# Patient Record
Sex: MALE | Race: WHITE | NOT HISPANIC OR LATINO | Employment: FULL TIME | URBAN - METROPOLITAN AREA
[De-identification: names, ages, dates, MRNs, and addresses within clinical notes are randomized per-mention and may not be internally consistent; named-entity substitution may affect disease eponyms.]

---

## 2019-01-24 ENCOUNTER — HOSPITAL ENCOUNTER (EMERGENCY)
Facility: CLINIC | Age: 54
Discharge: HOME | End: 2019-01-24
Attending: EMERGENCY MEDICINE | Admitting: EMERGENCY MEDICINE
Payer: COMMERCIAL

## 2019-01-24 VITALS
RESPIRATION RATE: 16 BRPM | HEART RATE: 95 BPM | OXYGEN SATURATION: 97 % | HEIGHT: 74 IN | TEMPERATURE: 99.5 F | DIASTOLIC BLOOD PRESSURE: 84 MMHG | BODY MASS INDEX: 38.5 KG/M2 | WEIGHT: 300 LBS | SYSTOLIC BLOOD PRESSURE: 159 MMHG

## 2019-01-24 DIAGNOSIS — F10.10 ALCOHOL ABUSE: ICD-10-CM

## 2019-01-24 LAB
ALBUMIN SERPL-MCNC: 4.1 G/DL (ref 3.4–5)
ALP SERPL-CCNC: 73 U/L (ref 40–150)
ALT SERPL W P-5'-P-CCNC: 40 U/L (ref 0–70)
ANION GAP SERPL CALCULATED.3IONS-SCNC: 10 MMOL/L (ref 3–14)
AST SERPL W P-5'-P-CCNC: 42 U/L (ref 0–45)
BASOPHILS # BLD AUTO: 0.1 10E9/L (ref 0–0.2)
BASOPHILS NFR BLD AUTO: 0.6 %
BILIRUB SERPL-MCNC: 1.6 MG/DL (ref 0.2–1.3)
BUN SERPL-MCNC: 18 MG/DL (ref 7–30)
CALCIUM SERPL-MCNC: 8.1 MG/DL (ref 8.5–10.1)
CHLORIDE SERPL-SCNC: 97 MMOL/L (ref 94–109)
CO2 SERPL-SCNC: 24 MMOL/L (ref 20–32)
CREAT SERPL-MCNC: 0.73 MG/DL (ref 0.66–1.25)
DIFFERENTIAL METHOD BLD: ABNORMAL
EOSINOPHIL # BLD AUTO: 0 10E9/L (ref 0–0.7)
EOSINOPHIL NFR BLD AUTO: 0.1 %
ERYTHROCYTE [DISTWIDTH] IN BLOOD BY AUTOMATED COUNT: 12.8 % (ref 10–15)
ETHANOL SERPL-MCNC: <0.01 G/DL
GFR SERPL CREATININE-BSD FRML MDRD: >90 ML/MIN/{1.73_M2}
GLUCOSE SERPL-MCNC: 156 MG/DL (ref 70–99)
HCT VFR BLD AUTO: 45.9 % (ref 40–53)
HGB BLD-MCNC: 16.4 G/DL (ref 13.3–17.7)
IMM GRANULOCYTES # BLD: 0 10E9/L (ref 0–0.4)
IMM GRANULOCYTES NFR BLD: 0.2 %
LIPASE SERPL-CCNC: 55 U/L (ref 73–393)
LYMPHOCYTES # BLD AUTO: 1.4 10E9/L (ref 0.8–5.3)
LYMPHOCYTES NFR BLD AUTO: 14.1 %
MAGNESIUM SERPL-MCNC: 1.6 MG/DL (ref 1.6–2.3)
MCH RBC QN AUTO: 33.5 PG (ref 26.5–33)
MCHC RBC AUTO-ENTMCNC: 35.7 G/DL (ref 31.5–36.5)
MCV RBC AUTO: 94 FL (ref 78–100)
MONOCYTES # BLD AUTO: 0.4 10E9/L (ref 0–1.3)
MONOCYTES NFR BLD AUTO: 3.9 %
NEUTROPHILS # BLD AUTO: 8.3 10E9/L (ref 1.6–8.3)
NEUTROPHILS NFR BLD AUTO: 81.1 %
NRBC # BLD AUTO: 0 10*3/UL
NRBC BLD AUTO-RTO: 0 /100
PLATELET # BLD AUTO: 166 10E9/L (ref 150–450)
POTASSIUM SERPL-SCNC: 3.6 MMOL/L (ref 3.4–5.3)
PROT SERPL-MCNC: 8.2 G/DL (ref 6.8–8.8)
RBC # BLD AUTO: 4.89 10E12/L (ref 4.4–5.9)
SODIUM SERPL-SCNC: 131 MMOL/L (ref 133–144)
WBC # BLD AUTO: 10.2 10E9/L (ref 4–11)

## 2019-01-24 PROCEDURE — 90791 PSYCH DIAGNOSTIC EVALUATION: CPT

## 2019-01-24 PROCEDURE — 80053 COMPREHEN METABOLIC PANEL: CPT | Performed by: EMERGENCY MEDICINE

## 2019-01-24 PROCEDURE — 85025 COMPLETE CBC W/AUTO DIFF WBC: CPT | Performed by: EMERGENCY MEDICINE

## 2019-01-24 PROCEDURE — 96376 TX/PRO/DX INJ SAME DRUG ADON: CPT

## 2019-01-24 PROCEDURE — 96361 HYDRATE IV INFUSION ADD-ON: CPT

## 2019-01-24 PROCEDURE — 99285 EMERGENCY DEPT VISIT HI MDM: CPT | Mod: 25

## 2019-01-24 PROCEDURE — 25000128 H RX IP 250 OP 636: Performed by: EMERGENCY MEDICINE

## 2019-01-24 PROCEDURE — 83735 ASSAY OF MAGNESIUM: CPT | Performed by: EMERGENCY MEDICINE

## 2019-01-24 PROCEDURE — 93005 ELECTROCARDIOGRAM TRACING: CPT

## 2019-01-24 PROCEDURE — 83690 ASSAY OF LIPASE: CPT | Performed by: EMERGENCY MEDICINE

## 2019-01-24 PROCEDURE — 80320 DRUG SCREEN QUANTALCOHOLS: CPT | Performed by: EMERGENCY MEDICINE

## 2019-01-24 PROCEDURE — 96374 THER/PROPH/DIAG INJ IV PUSH: CPT

## 2019-01-24 RX ORDER — LORAZEPAM 2 MG/ML
1 INJECTION INTRAMUSCULAR ONCE
Status: COMPLETED | OUTPATIENT
Start: 2019-01-24 | End: 2019-01-24

## 2019-01-24 RX ORDER — CHLORDIAZEPOXIDE HYDROCHLORIDE 25 MG/1
25 CAPSULE, GELATIN COATED ORAL 3 TIMES DAILY PRN
Qty: 15 CAPSULE | Refills: 0 | Status: SHIPPED | OUTPATIENT
Start: 2019-01-24 | End: 2019-01-29

## 2019-01-24 RX ORDER — LORAZEPAM 2 MG/ML
0.5 INJECTION INTRAMUSCULAR ONCE
Status: COMPLETED | OUTPATIENT
Start: 2019-01-24 | End: 2019-01-24

## 2019-01-24 RX ADMIN — LORAZEPAM 1 MG: 2 INJECTION, SOLUTION INTRAMUSCULAR; INTRAVENOUS at 18:16

## 2019-01-24 RX ADMIN — SODIUM CHLORIDE 1000 ML: 9 INJECTION, SOLUTION INTRAVENOUS at 18:16

## 2019-01-24 RX ADMIN — LORAZEPAM 0.5 MG: 2 INJECTION, SOLUTION INTRAMUSCULAR; INTRAVENOUS at 19:07

## 2019-01-24 ASSESSMENT — ENCOUNTER SYMPTOMS
HEADACHES: 1
SHORTNESS OF BREATH: 0
MYALGIAS: 1
TREMORS: 1
VOMITING: 1
HALLUCINATIONS: 0
WEAKNESS: 0
NAUSEA: 1
FEVER: 0

## 2019-01-24 ASSESSMENT — MIFFLIN-ST. JEOR: SCORE: 2280.54

## 2019-01-24 NOTE — ED PROVIDER NOTES
History     Chief Complaint:  Alcohol Withdrawal    HPI   Amrik Burdick is a 54 year old male with history of alcohol abuse and HTN who presents to the emergency department for evaluation of symptoms related to alcohol withdrawal. He reports that his last drink was about 24 hours ago; he is trying to quit. Today, he became tremulous and nauseous at home with occasional hot flashes and dyspnea. He called [facility] for treatment possibilities, and they told him to present to the ED if his symptoms worsen. Here, he reports a mild frontal headache and myalgias. He denies any chest pain, fevers, hallucinations, or suicidal/homicidal ideations. He denies any recent vomiting, but reports he has only eaten a banana today. Previously, he was drinking about 750 mL of vodka per day. He has gone through withdrawal in the past, but denies any history of associated seizures, nor has he been admitted to a detox program.     Allergies:  NKDA    Medications:    The patient is currently on no regular medications.    Past Medical History:    Alcohol abuse.   Gout  HTN  Hyperlipidemia    Past Surgical History:    The patient does not have any pertinent past surgical history.    Family History:    No past pertinent family history.    Social History:  Marital Status:   [2]  Wife dropped him off at ED.   Positive for alcohol use.   Negative for drug use.      Review of Systems   Constitutional: Negative for fever.   Respiratory: Negative for shortness of breath.    Cardiovascular: Negative for chest pain.   Gastrointestinal: Positive for nausea and vomiting (not recently).   Musculoskeletal: Positive for myalgias.   Neurological: Positive for tremors and headaches. Negative for weakness.   Psychiatric/Behavioral: Negative for hallucinations and suicidal ideas.   All other systems reviewed and are negative.      Physical Exam     Patient Vitals for the past 24 hrs:   BP Temp Temp src Pulse Resp SpO2 Height Weight   01/24/19 1900  "(!) 158/94 -- -- 92 -- 98 % -- --   01/24/19 1830 (!) 170/99 -- -- 95 -- 99 % -- --   01/24/19 1810 -- 99.5  F (37.5  C) Oral -- -- -- -- --   01/24/19 1800 (!) 174/109 -- -- 98 -- 99 % -- --   01/24/19 1754 -- -- -- -- -- -- 1.88 m (6' 2\") 136.1 kg (300 lb)   01/24/19 1744 (!) 141/99 -- -- 101 16 98 % -- --     Physical Exam  Nursing note and vitals reviewed.  Constitutional: Well nourished. Resting comfortably.   Eyes: Conjunctiva normal.  Pupils are equal, round, and reactive to light.   ENT: Nose normal. Mucous membranes pink and moist.    Neck: Normal range of motion.  CVS: Sinus tachycardia.  Normal heart sounds.  No murmur.  Pulmonary: Lungs clear to auscultation bilaterally. No wheezes/rales/rhonchi.  GI: Abdomen soft. Nontender, nondistended. No rigidity or guarding.    MSK: No calf tenderness or swelling.  Neuro: Awake, alert. Speech is normal and fluent. Face is symmetric. EOMI. PERRL. Moves all extremities. Normal finger-nose-finger.  strength equal bilaterally. Equal sensation bilaterally on UE/LE and face. No arm or leg drift. Gait stable. Mildly tremulous.   Skin: Skin is warm and dry. No rash noted.   Psychiatric: Mildly anxious appearing. Denies suicidal or homicidal ideations. Good insight. No hallucinations.        Emergency Department Course   ECG:  Indication: Withdrawal  Time: 1803  Vent. Rate 95 bpm. PA interval 164. QRS duration 86. QT/QTc 382/480. P-R-T axis 48 16 40.  Normal sinus rhythm. Prolonged QT. Abnormal ECG. Read time: 1804.    Laboratory:  CBC: WBC: 10.2, HGB: 16.4, PLT: 166  CMP: Glucose 156 (H), Na: 131 (L), Ca: 8.1 (L), Bilirubin: 1.6 (H), o/w WNL (Creatinine: 0.73)    Lipase: 55 (L)  Magnesium: 1.6   Alcohol level: <0.01    Interventions:  1816 NS 1L IV   Ativan, 1 mg, IV injection  1907 Ativan, 0.5 mg, IV injection    Emergency Department Course:  Nursing notes and vitals reviewed.   (1759) I performed an exam of the patient as documented above.      IV inserted. Medicine " administered as documented above. Blood drawn. This was sent to the lab for further testing, results above.     (1829) I rechecked the patient and discussed the results of his workup thus far. He is feeling better.     (1913) [facility] is in with the patient at this time. They provided patient with list of outpatient resources.        Findings and plan explained to the Patient. Patient discharged home with instructions regarding supportive care, medications, and reasons to return. The importance of close follow-up was reviewed. The patient was prescribed Librium and provided referrals for detox from [facility].      I personally reviewed the laboratory results with the Patient and answered all related questions prior to discharge.       Impression & Plan      Medical Decision Making:  Amrik Burdick is a 54 year old male with a history of alcohol abuse and hypertension presenting with concerns for alcohol withdrawal. He is mildly tremulous and hypertensive on arrival. He denies any hallucinations, suicidal or homicidal ideations, or response to internal stimuli. Labs reassuring.  On reevaluation, after IV fluids and benzodiazepines, he reported significant symptom improvement. His report vitals improved.  Patient was evaluated by [facility] team who provided patient outpatient resources for alcohol detox programs. His wife was in agreement with plan. Patient will be discharged home with a few doses of librium to curb withdrawal side effects. He was instructed to return to the ED for worsening symptoms. Wife picked patient up from the ED in stable condition.      Critical Care time:  none    Diagnosis:    ICD-10-CM    1. Alcohol abuse F10.10        Disposition:  discharged to home    Discharge Medications:     Medication List      Started    chlordiazePOXIDE 25 MG capsule  Commonly known as:  LIBRIUM  25 mg, Oral, 3 TIMES DAILY PRN          Scribe Disclosure:  I, Marii Frazier, am serving as a scribe on 1/24/2019 at 5:59 PM to  personally document services performed by Renée Burdick DO based on my observations and the provider's statements to me.     Marii Freddie  1/24/2019   [facility] EMERGENCY DEPARTMENT         Renée Burdick DO  01/24/19 8583

## 2019-01-24 NOTE — ED AVS SNAPSHOT
[facility] Emergency Department  [address]  [facility] [address]  Phone:  [phone]  Fax:  [phone]                                    Amrik Burdick   MRN: [id number]    Department:  [facility] Emergency Department   Date of Visit:  1/24/2019           After Visit Summary Signature Page    I have received my discharge instructions, and my questions have been answered. I have discussed any challenges I see with this plan with the nurse or doctor.    ..........................................................................................................................................  Patient/Patient Representative Signature      ..........................................................................................................................................  Patient Representative Print Name and Relationship to Patient    ..................................................               ................................................  Date                                   Time    ..........................................................................................................................................  Reviewed by Signature/Title    ...................................................              ..............................................  Date                                               Time          22EPIC Rev 08/18

## 2019-01-24 NOTE — ED TRIAGE NOTES
Patient states that he is looking to get treatment for ETOH abuse and hasn't had a drink in 24 hours. Has sever shaking and unable to sleep. Stomach aches, denies nausea and vomiting. Denies history of seizures, has treatment in place and was told to come to the ED to be medically cleared.

## 2019-01-25 LAB — INTERPRETATION ECG - MUSE: NORMAL

## 2019-01-31 ENCOUNTER — HOSPITAL ENCOUNTER (OUTPATIENT)
Dept: BEHAVIORAL HEALTH | Facility: CLINIC | Age: 54
Discharge: HOME | End: 2019-01-31
Attending: SOCIAL WORKER | Admitting: SOCIAL WORKER
Payer: COMMERCIAL

## 2019-01-31 PROCEDURE — H0001 ALCOHOL AND/OR DRUG ASSESS: HCPCS

## 2019-01-31 RX ORDER — LISINOPRIL 10 MG/1
10 TABLET ORAL DAILY
COMMUNITY

## 2019-01-31 RX ORDER — SIMVASTATIN 10 MG
10 TABLET ORAL AT BEDTIME
COMMUNITY

## 2019-01-31 ASSESSMENT — ANXIETY QUESTIONNAIRES
2. NOT BEING ABLE TO STOP OR CONTROL WORRYING: NOT AT ALL
4. TROUBLE RELAXING: NOT AT ALL
7. FEELING AFRAID AS IF SOMETHING AWFUL MIGHT HAPPEN: NOT AT ALL
GAD7 TOTAL SCORE: 0
1. FEELING NERVOUS, ANXIOUS, OR ON EDGE: NOT AT ALL
6. BECOMING EASILY ANNOYED OR IRRITABLE: NOT AT ALL
5. BEING SO RESTLESS THAT IT IS HARD TO SIT STILL: NOT AT ALL
3. WORRYING TOO MUCH ABOUT DIFFERENT THINGS: NOT AT ALL

## 2019-01-31 ASSESSMENT — PAIN SCALES - GENERAL: PAINLEVEL: NO PAIN (0)

## 2019-01-31 ASSESSMENT — PATIENT HEALTH QUESTIONNAIRE - PHQ9: SUM OF ALL RESPONSES TO PHQ QUESTIONS 1-9: 0

## 2019-01-31 NOTE — PROGRESS NOTES
[facility]  [address], [address]  [place] [address]      ADULT CD ASSESSMENT ADDENDUM      Patient Name: Amrik Burdick  Cell Phone:   Home: [phone] (home)    Mobile:   Telephone Information:   Mobile [phone]       Email:  [email or web address]  Emergency Contact: Kerry Burdick   Tel: [phone]    The patient reported being:      With which race do you identify? White    Initial Screening Questions     1. Are you currently having severe withdrawal symptoms that are putting yourself or others in danger?  No    2. Are you currently having severe medical problems that require immediate attention?  No    3. Are you currently having severe emotional or behavioral problems that are putting yourself or others at risk of harm?  No    4. Do you have sufficient reading skills that will enable you to understand written materials, including the program rules and client rights materials?  Yes     Family History and other additional information     Who raised you? (parents, grandparents, adoptive parents, step-parents, etc.)    Both Parents    Please tell me what it was like growing up in your family. (please include any history of substance abuse, mental health issues, emotional/physical/sexual abuse, forms of discipline, and support)     Parents  (second grade), 1 full brother, 1 half sister, and a bunch of step-siblings.  No substance abuse in family history.  No known mental health.  No abuse.  Good upbringing, dad lived in [place] and mom lived in [place].      Do you have any children or Stepchildren? Yes, explain: 2 adult sons    Are you being investigated by Child Protection Services? No    Do you have a child protection worker, probation office or ?  No    How would you describe your current finances?  Doing okay    If you are having problems, (unpaid bills, bankruptcy, IRS problems) please explain:  No    If working or a student are you able to  function appropriately in that setting? Yes     Describe your preferred learning style:  by reading    What are your some of your personal strengths?  Good friends    Do you currently participate in community linda activities, such as attending Hindu, temple, Advent or Catholic services?  No    How does your spirituality impact your recovery?  Not in recovery.    Do you currently self-administer your medications?  Yes    Have you ever had to lie to people important to you about how much you mcginnis?   No   Have you ever felt the need to bet more and more money?   No   Have you ever attempted treatment for a gambling problem?   No   Have you ever touched or fondled someone else inappropriately or forced them to have sex with you against their will?   No   Are you or have you ever been a registered sex offender?   No   Is there any history of sexual abuse in your family? No   Have you ever felt obsessed by your sexual behavior, such as having sex with many partners, masturbating often, using pornography often?   No     Have you ever received therapy or stayed in the hospital for mental health problems?   No     Have you ever hurt yourself, such as cutting, burning or hitting yourself?   No     Have you ever purged, binged or restricted yourself as a way to control your weight?   No     Are you on a special diet?   No     Do you have any concerns regarding your nutritional status?   No     Have you had any appetite changes in the last 3 months?   No   Have you had weight loss or weight gain of more than 10 lbs in the last 3 months?   If patient gained or lost more than 10 lbs, then refer to program RN / attending Physician for assessment.   No   Was the patient informed of BMI?       No   Have you engaged in any risk-taking behavior that would put you at risk for exposure to blood-borne or sexually transmitted diseases?   No   Do you have any dental problems?   No   Have you ever lived through any trauma or stressful  life events?   No   In the past month, have you had any of the following symptoms related to the trauma listed above? (dreams, intense memories, flashbacks, physical reactions, etc.)   No   Have you ever believed people were spying on you, or that someone was plotting against you or trying to hurt you?   No   Have you ever believed someone was reading your mind or could hear your thoughts or that you could actually read someone's mind or hear what another person was thinking?   No   Have you ever believed that someone of some force outside of yourself was putting thoughts into your mind or made you act in a way that was not your usual self?  Have you ever though you were possessed?   No   Have you ever believed you were being sent special messages through the TV, radio or newspaper?   No   Have you ever heard things other people couldn't hear, such as voices or other noises?   No   Have you ever had visions when you were awake?  Or have you ever seen things other people couldn't see?   No   Do you have a valid 's license?    Yes     PHQ-9, YEVGENIY-7 and Suicide Risk Assessment   PHQ-9 on 1/31/2019 YEVGENIY-7 on 1/31/2019   The patient's PHQ-9 score was 0 out of 27, indicating no depression.   The patient's YEVGENIY-7 score was 0 out of 21, indicating minimal anxiety.       [place]-Suicide Severity Rating Scale   Suicide Ideation   1.) Have you ever wished you were dead or that you could go to sleep and not wake up?     Lifetime:  No   Past Month:  No     2.) Have you actually had any thoughts of killing yourself?   Lifetime:  No   Past Month:  No     3.) Have you been thinking about how you might do this?     Lifetime:  No   Past Month:  No     4.) Have you had these thoughts and had some intention of acting on them?     Lifetime:  No   Past Month:  No         Lifetime:  No   Past Month:  No     6.) Do you intend to carry out this plan?      Lifetime:  No   Past Month:  No     Intensity of Ideation   Intensity of ideation  (1 being least severe, 5 being most severe):     Lifetime:  The patient denied ever having any suicidal thoughts in life.   Past Month:  The patient denied having any suicidal thoughts within the past month.     How often do you have these thoughts?  The patient denied having any suicidal thoughts within the past month.     When you have the thoughts how long do they last?  The patient denied having any suicidal thoughts within the past month.     Can you stop thinking about killing yourself or wanting to die if you want to?  The patient denied having any suicidal thoughts within the past month.     Are there things - anyone or anything (i.e. family, Hoahaoism, pain of death) that stopped you from wanting to die or acting on thoughts of suicide?  Does not apply     What sort of reasons did you have for thinking about wanting to die or killing yourself (ie end pain, stop how you were feeling, get attention or reaction, revenge)?  Does not apply     Suicidal Behavior   (Suicide Attempt) - Have you made a suicide attempt?     Lifetime:  The patient had never made a suicide attempt.   Past Month:  The patient had not made a suicide attempt within the past month.     Have you engaged in self-harm (non-suicidal self-injury)?  The patient denied having any history of engaging in self-harm (non-suicidal self-injury).     (Interrupted Attempt) - Has there been a time when you started to do something to end your life but someone or something stopped you before you actually did anything?  The patient denied having any history of an interrupted suicide attempt.     (Aborted or Self-Interrupted Attempt) - Has there been a time when you started to do something to try to end your life but you stopped yourself before you actually did anything?  The patient denied having any history of an aborted or self-interrupted suicide attempt.     (Preparatory Acts of Behavior) - Have you taken any steps towards making suicide attempt or  "preparing to kill yourself (such as collecting pills, getting a gun, giving valuables away or writing a suicide note)?  The patient denied having any history of taking any steps towards making a suicide attempt or preparing to kill self.     Actual Lethality/Medical Damage:  The patient denied ever making a suicidal attempt.       2008  [facility]  Used with permission by Natalie Ag, PhD.       Guide to C-SSRS Risk Ratings   NO IDEATION:  with no active thoughts IDEATION: with a wish to die. IDEATION: with active thoughts. Risk Ratings   If Yes No No 0 - Very Low Risk   If NA Yes No 1 - Low Risk   If NA Yes Yes 2 - Low/moderate risk   IDEATION: associated thoughts of methods without intent or plan INTENT: Intent to follow through on suicide PLAN: Plan to follow through on suicide Risk Ratings cont...   If Yes No No 3 - Moderate Risk   If Yes Yes No 4 - High Risk   If Yes Yes Yes 5 - High Risk   The patient's ADDITIONAL RISK FACTORS and lack of PROTECTIVE FACTORS may increase their overall suicide risk ratings.     Additional Risk Factors:    Substance use   Protective Factors:    Having people in his/her life that would prevent the patient from considering a suicide attempt (i.e. young children, spouse, parents, etc.)     Having a good community support network     Risk Status   Past month:0. - Very Low Risk:  Evaluation Counselors:  Document in Epic / ip.accessAR to counselor \"Very Low Risk\".      Treatment Counselors:  Reassess upon admission as applicable, assess weekly in progress notes under Dimension 3 and summarize in Discharge / Treatment summary under Dimension 3.    Past 24 hours:0. - Very Low Risk:  Evaluation Counselors:  Document in Epic / SBAR to counselor \"Very Low Risk\".      Treatment Counselors:  Reassess upon admission as applicable, assess weekly in progress notes under Dimension 3 and summarize in Discharge / Treatment summary under Dimension 3.   Additional " information to support suicide risk rating: There was no additional information to provide at this time.     Mental Health Status   Physical Appearance/Attire: Appears stated age and Attire appropriate to age/situation   Hygiene: well groomed   Eye Contact: at examiner   Speech Rate:  regular   Speech Volume: regular   Speech Quality: fluid   Cognitive/Perceptual:  reality based   Cognition: memory intact    Judgment: intact   Insight: intact   Orientation:  time, place, person and situation   Thought: logical    Hallucinations:  none   General Behavioral Tone: cooperative   Psychomotor Activity: no problem noted   Gait:  no problem   Mood: appropriate   Affect: congruence/appropriate   Counselor Notes: NA     Criteria for Diagnosis: DSM-5 Criteria for Substance Use Disorders      Alcohol Use Disorder Mild - 305.00 (F10.10)   Tobacco Use Disorder Mild - 305.10 (Z72.0)    Level of Care   I.) Intoxication and Withdrawal: 0   II.) Biomedical:  0   III.) Emotional and Behavioral:  0   IV.) Readiness to Change:  1   V.) Relapse Potential: 1   VI.) Recovery Environmental: 1     Initial Problem List     None.    Patient/Client is willing to follow treatment recommendations.  Yes    Counselor: Radha [facility]ora [facility]      Vulnerable Adult Checklist for OUTPATIENTS     1.  Do you have a physical, emotional or mental infirmity or dysfunction?       No    2.  Does this issue impair your ability to provide for your own care without help, including providing yourself with food, shelter, clothing, healthcare or supervision?       No    3.  Because of this issue, I need assistance to protect myself from maltreatment by others.      No    Based on the above information:    This person is not a functional Vulnerable Adult according to [place] Statute 626.5572 subdivision 21.

## 2019-01-31 NOTE — PROGRESS NOTES
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 currently meets the criteria for Substance Use Disorder using the diagnostic criteria in the DSM-V pp.481-581. Current means during the most recent 12 months outside a facility that controls access to substances    Category of Substance Severity (ICD-10 Code / DSM 5 Code)     Alcohol Use Disorder Mild  (F10.10) (305.00)   Cannabis Use Disorder The patient does not meet the criteria for a Cannabis use disorder.   Hallucinogen Use Disorder The patient does not meet the criteria for a Hallucinogen use disorder.   Inhalant Use Disorder The patient does not meet the criteria for an Inhalant use disorder.   Opioid Use Disorder The patient does not meet the criteria for an Opioid use disorder.   Sedative, Hypnotic, or Anxiolytic Use Disorder The patient does not meet the criteria for a Sedative/Hypnotic use disorder.   Stimulant Related Disorder The patient does not meet the criteria for a Stimulant use disorder.   Tobacco Use Disorder Mild    (Z72.0) (305.1)   Other (or unknown) Substance Use Disorder The patient does not meet the criteria for a Other (or unknown) Substance use disorder.       Collateral Contact Summary    Number of contacts made: 2    Contact with referring person:  Yes    If court related records were reviewed, summarize here: No court records had been reviewed at the time of this documentation.    Information from collateral contacts supported/largely agreed with information from the client and associated risk ratings.      Rule 25 Assessment Summary and Plan   's Recommendation    Client is recommended to attend the Discovery program at [facility].      Collateral Contacts     Name:    Kerry Burdick   Relationship:    wife   Phone Number:    [phone] Releases:    Yes     He is an alcoholic. It has been like that for 30 years.  I do not know his amounts because he hides it, but I think it is more than an average person can  a day.  I have asked many times throughout the years so have backed out of it and don't pay attention to him when he is. He gets angry when he drinks, and sometimes gets depressed and then drinks more.      Collateral Contacts     Name:    Palma   Relationship:    Medical records   Phone Number:    [phone]   Releases:    No     EHR was reviewed including ED visit on 1/24/19 due to alcohol abuse.    ollateral Contacts      A problematic pattern of alcohol/drug use leading to clinically significant impairment or distress, as manifested by at least two of the following, occurring within a 12-month period:    9.) Alcohol/drug use is continued despite knowledge of having a persistent or recurrent physical or psychological problem that is likely to have been caused or exacerbated by alcohol.  11.) Withdrawal, as manifested by either of the following: The characteristic withdrawal syndrome for alcohol/drug (refer to Criteria A and B of the criteria set for alcohol/drug withdrawal).      Specify if: In early remission:  After full criteria for alcohol/drug use disorder were previously met, none of the criteria for alcohol/drug use disorder have been met for  at least 3 months but for less than 12 months (with the exception that Criterion A4,  Craving or a strong desire or urge to use alcohol/drug  may be met).     In sustained remission:   After full criteria for alcohol use disorder were previously met, none of the criteria for alcohol/drug use disorder have been met at any time during a period of 12 months or longer (with the exception that Criterion A4,  Craving or strong desire or urge to use alcohol/drug  may be met).   Specify if:   This additional specifier is used if the individual is in an environment where access to alcohol is restricted.    Mild: Presence of 2-3 symptoms  Moderate: Presence of 4-5 symptoms  Severe: Presence of 6 or more symptoms

## 2019-02-01 ASSESSMENT — ANXIETY QUESTIONNAIRES: GAD7 TOTAL SCORE: 0

## 2019-02-06 NOTE — PROGRESS NOTES
Visit Date:   01/31/2019      DATE OF EVALUATION:  01/31/2019.     EVALUATION COUNSELOR:  DELLA Leon   CLIENT'S ADDRESS: [address] [place] [address].   TELEPHONE NUMBER:  [phone].   STATISTICS:  Date of Birth 1965, age 54. Sex:  Male.  Marital Status:  .   INSURANCE:  [facility].      REFERRAL SOURCE:  Healthcare provider.      REASON FOR EVALUATION:  Amrik Burdick reports he had been at a cabin with some friends, had been drinking heavily over the weekend.  Upon returning home, he then began experiencing some withdrawal symptoms, so he took himself to the Emergency Department for possible detox needs.  Following that, he had a meeting with his primary care provider, who at the time, suggested he seek an assessment for substance use concerns.      HEALTH HISTORY AND MEDICATIONS:  Client reports a history of high blood pressure, cholesterol and gout.  He does go through [facility] and his current medications are lisinopril, simvastatin and 3 other unknown medications for blood pressure and gout, unknown names of medications.      HISTORY OF PREVIOUS TREATMENT AND COUNSELING:  Client denies any history of detox admissions.  He just reports the one emergency department visit on 01/24/2019 for withdrawal concern at [facility], no prior hospitalizations with alcohol or drug use.  He denies any history of any mental health services include individual therapy or psychiatry services.  He has no history of substance use treatment and reports he has attended some AA meetings 20+ years ago following a DUI, none current.      HISTORY OF ALCOHOL AND DRUG USE:  Client reports alcohol use.  Age of first use 14.  Reports typically in the lynch, his alcohol will increase a little bit, and he will drink usually once a week, 5-6 beers per time.  In the summers, it typically decreases to once every couple of weeks and the same amount 5-6  beers per time.  Reports 4-5 times a year, he will have a weekend binge on a vacation or when with friends.    Denies any other periods of regular or heavy use, last use 01/23/2019, and client also reports he is a tobacco user, unspecified age of first use, but he chews about a tin every 2 days, last use 01/31/2019.  Client denies any other substance use.       SUMMARY OF CHEMICAL DEPENDENCY SYMPTOMS ACKNOWLEDGED BY CLIENT:  Client identifies with 2 out of the 11 DSM-V criteria for impression of a substance use disorder.      SUMMARY OF COLLATERAL DATA:  I spoke with client's wife, Kerry Burdick.  She reports that she feels the client is an alcoholic.  Reports he has been drinking for 30 years.  She reports unknown amounts as client does hide his drinking and does at times report she thinks he gets more depressed and angry when drinking.  Also, [place]'s electronic health record was reviewed.      MENTAL STATUS ASSESSMENT:  Physical appearance attire: Appears stated age and attire appropriate to age and situation.  Hygiene:  Well groomed.  Eye contact at examiner.  Speech regular, volume regular, quality fluid.  Cognitive perceptual reality based.  Cognition:  Memory intact.  Judgment intact, insight intact.  Orientation to time, place, person and situation.  Thought logical.  Hallucinations:  None.  General behavioral tone:  Cooperative.  Psychomotor activity: No problem noted.  Gait:  No problem.  Mood appropriate.  Affect congruent, appropriate.      VULNERABLE ADULT ASSESSMENT:  This person is not a functional vulnerable adult according to [place] statute 626.5572, subdivision 21.      DIAGNOSTIC IMPRESSION:  F10.10.  Alcohol use disorder, mild and Z72.0, tobacco use disorder, mild.      [facility] PLACEMENT CRITERIA:   DIMENSION 1:  Intoxication/Withdrawal:  Risk level 0.  No concern.       DIMENSION 2:  Biomedical Conditions:  Risk level 0.  Client has health issues managed by a primary care provider.       DIMENSION 3:  Emotional Behavioral:  Risk level 0.  Client denies any history of mental health diagnosis or concern.  He reports anxiety and sleep issues related to withdrawal concern after most recent binge drinking, but denies any daily concerns with symptoms or stressors; he denies any suicidal ideation.       DIMENSION 4:  Readiness to Change risk level 1.  Client was cooperative with evaluation and was open to recommendations.  He is unsure about changes needed, but does recognize some concerns with the use.      DIMENSION 5:  Relapse and Continued Use Potential:  Risk level 1.  Client has no history of treatment or any recent past consequences from use.  He does acknowledge some unhealthy behaviors at times with alcohol consumption and could benefit from some services to enhance his insight into his own relationship with alcohol and skills to prevent potential problem.      DIMENSION 6:  Recovery in REM at risk level 1.  Client reports he is .  Wife is supportive and he has 2 adult sons out of the home.  He does report a social network and at times, his drinking is prevalent in social activities but reports he does have social support that does not encourage alcohol use.  He is employed full-time and denies any job concerns.  He denies any legal issues.      INITIAL PROBLEM LIST:  None.      RECOMMENDATIONS:  Client is recommended to attend the discovery program at [facility].      RATIONALE:  At this time, client meets criteria for mild substance use disorder.  He is ambivalent about making changes regarding his alcohol use; however, does recognize some irresponsible consumption pattern that would benefit from a program in order for him to further assess his own relationship with alcohol and develop motivation for any necessary changes for healthier behaviors.         This information has been disclosed to you from records protected by Federal confidentiality rules (42 CFR part 2).  The Federal rules prohibit you from making any further disclosure of this information unless further disclosure is expressly permitted by the written consent of the person to whom it pertains or as otherwise permitted by 42 CFR part 2. A general authorization for the release of medical or other information is NOT sufficient for this purpose. The Federal rules restrict any use of the information to criminally investigate or prosecute any alcohol or drug abuse patient.      DELLA COLEMAN             D: 2019   T: 2019   MT: MARBELLA      Name:     MATTEO RODAS   MRN:      [id number]        Account:      [id number]   :      1965           Visit Date:   2019      Document: [id number]

## 2019-10-17 ENCOUNTER — HOSPITAL ENCOUNTER (EMERGENCY)
Facility: CLINIC | Age: 54
Discharge: HOME | End: 2019-10-17
Attending: EMERGENCY MEDICINE | Admitting: EMERGENCY MEDICINE
Payer: COMMERCIAL

## 2019-10-17 ENCOUNTER — APPOINTMENT (OUTPATIENT)
Dept: GENERAL RADIOLOGY | Facility: CLINIC | Age: 54
End: 2019-10-17
Attending: EMERGENCY MEDICINE
Payer: COMMERCIAL

## 2019-10-17 VITALS
RESPIRATION RATE: 20 BRPM | OXYGEN SATURATION: 97 % | SYSTOLIC BLOOD PRESSURE: 151 MMHG | TEMPERATURE: 98.8 F | DIASTOLIC BLOOD PRESSURE: 105 MMHG | HEART RATE: 80 BPM

## 2019-10-17 DIAGNOSIS — R07.9 CHEST PAIN, UNSPECIFIED TYPE: ICD-10-CM

## 2019-10-17 DIAGNOSIS — I10 HYPERTENSION, UNSPECIFIED TYPE: ICD-10-CM

## 2019-10-17 LAB
ANION GAP SERPL CALCULATED.3IONS-SCNC: 7 MMOL/L (ref 3–14)
BASOPHILS # BLD AUTO: 0.1 10E9/L (ref 0–0.2)
BASOPHILS NFR BLD AUTO: 0.8 %
BUN SERPL-MCNC: 20 MG/DL (ref 7–30)
CALCIUM SERPL-MCNC: 8.7 MG/DL (ref 8.5–10.1)
CHLORIDE SERPL-SCNC: 101 MMOL/L (ref 94–109)
CO2 SERPL-SCNC: 28 MMOL/L (ref 20–32)
CREAT SERPL-MCNC: 0.79 MG/DL (ref 0.66–1.25)
D DIMER PPP FEU-MCNC: <0.3 UG/ML FEU (ref 0–0.5)
DIFFERENTIAL METHOD BLD: ABNORMAL
EOSINOPHIL # BLD AUTO: 0.2 10E9/L (ref 0–0.7)
EOSINOPHIL NFR BLD AUTO: 2.3 %
ERYTHROCYTE [DISTWIDTH] IN BLOOD BY AUTOMATED COUNT: 12.3 % (ref 10–15)
GFR SERPL CREATININE-BSD FRML MDRD: >90 ML/MIN/{1.73_M2}
GLUCOSE SERPL-MCNC: 102 MG/DL (ref 70–99)
HCT VFR BLD AUTO: 41.8 % (ref 40–53)
HGB BLD-MCNC: 14.4 G/DL (ref 13.3–17.7)
IMM GRANULOCYTES # BLD: 0.1 10E9/L (ref 0–0.4)
IMM GRANULOCYTES NFR BLD: 0.9 %
LYMPHOCYTES # BLD AUTO: 2.2 10E9/L (ref 0.8–5.3)
LYMPHOCYTES NFR BLD AUTO: 32.4 %
MCH RBC QN AUTO: 33.4 PG (ref 26.5–33)
MCHC RBC AUTO-ENTMCNC: 34.4 G/DL (ref 31.5–36.5)
MCV RBC AUTO: 97 FL (ref 78–100)
MONOCYTES # BLD AUTO: 0.5 10E9/L (ref 0–1.3)
MONOCYTES NFR BLD AUTO: 7.5 %
NEUTROPHILS # BLD AUTO: 3.7 10E9/L (ref 1.6–8.3)
NEUTROPHILS NFR BLD AUTO: 56.1 %
NRBC # BLD AUTO: 0 10*3/UL
NRBC BLD AUTO-RTO: 0 /100
PLATELET # BLD AUTO: 153 10E9/L (ref 150–450)
POTASSIUM SERPL-SCNC: 3.2 MMOL/L (ref 3.4–5.3)
RBC # BLD AUTO: 4.31 10E12/L (ref 4.4–5.9)
SODIUM SERPL-SCNC: 136 MMOL/L (ref 133–144)
TROPONIN I SERPL-MCNC: <0.015 UG/L (ref 0–0.04)
WBC # BLD AUTO: 6.6 10E9/L (ref 4–11)

## 2019-10-17 PROCEDURE — 25000132 ZZH RX MED GY IP 250 OP 250 PS 637: Performed by: EMERGENCY MEDICINE

## 2019-10-17 PROCEDURE — 93005 ELECTROCARDIOGRAM TRACING: CPT

## 2019-10-17 PROCEDURE — 84484 ASSAY OF TROPONIN QUANT: CPT | Performed by: EMERGENCY MEDICINE

## 2019-10-17 PROCEDURE — 80048 BASIC METABOLIC PNL TOTAL CA: CPT | Performed by: EMERGENCY MEDICINE

## 2019-10-17 PROCEDURE — 85379 FIBRIN DEGRADATION QUANT: CPT | Performed by: EMERGENCY MEDICINE

## 2019-10-17 PROCEDURE — 99285 EMERGENCY DEPT VISIT HI MDM: CPT | Mod: 25

## 2019-10-17 PROCEDURE — 85025 COMPLETE CBC W/AUTO DIFF WBC: CPT | Performed by: EMERGENCY MEDICINE

## 2019-10-17 PROCEDURE — 71046 X-RAY EXAM CHEST 2 VIEWS: CPT

## 2019-10-17 RX ORDER — POTASSIUM CHLORIDE 1500 MG/1
20 TABLET, EXTENDED RELEASE ORAL ONCE
Status: COMPLETED | OUTPATIENT
Start: 2019-10-17 | End: 2019-10-17

## 2019-10-17 RX ADMIN — POTASSIUM CHLORIDE 20 MEQ: 1500 TABLET, EXTENDED RELEASE ORAL at 21:04

## 2019-10-17 ASSESSMENT — ENCOUNTER SYMPTOMS
BACK PAIN: 1
SHORTNESS OF BREATH: 0
NAUSEA: 0
DIZZINESS: 0
FEVER: 0
DIAPHORESIS: 0
COUGH: 0
ABDOMINAL PAIN: 0

## 2019-10-17 NOTE — ED AVS SNAPSHOT
[facility] Emergency Department  [address]  [facility] [address]  Phone:  [phone]  Fax:  [phone]                                    Amrik Burdick   MRN: [id number]    Department:  [facility] Emergency Department   Date of Visit:  10/17/2019           After Visit Summary Signature Page    I have received my discharge instructions, and my questions have been answered. I have discussed any challenges I see with this plan with the nurse or doctor.    ..........................................................................................................................................  Patient/Patient Representative Signature      ..........................................................................................................................................  Patient Representative Print Name and Relationship to Patient    ..................................................               ................................................  Date                                   Time    ..........................................................................................................................................  Reviewed by Signature/Title    ...................................................              ..............................................  Date                                               Time          22EPIC Rev 08/18

## 2019-10-17 NOTE — ED TRIAGE NOTES
Arrives with mid sternal chest pain that started about noon today, states it is intermittent, unrelieved by antacids, denies SOB or other symptoms. Alert and oriented, ABCs intact.

## 2019-10-18 LAB — INTERPRETATION ECG - MUSE: NORMAL

## 2019-10-18 NOTE — ED PROVIDER NOTES
History     Chief Complaint:  Chest Pain    The history is provided by the patient.      Amrik Burdick is a 54 year old male who presents with chest pain. Wirthin the last week, the patient developed back pain. For the last 8 hours, the patient has been experiencing intermittent centralized chest pain. He states that the severity of the pain has waxed and waned but never has gone away fully. He denies ever having this pain before. He tried taking ibuprofen, but saw little relief in that. He also notes he has been having intermittent leg swelling but is not painful. His persisting chest pain prompted the patient to seek evaluation in the emergency department today.     Patient denies any exertional activity that caused this, such as lifting straining, etc., though he does do a lot of physical work. He denies any shortness of breath, cough, fever, dizziness, nausea, diaphoresis, or upper abdominal pain.     Cardiac/PE/DVT Risk Factors:  The patient has a history of hypertension, hyperlipidemia, but no diabetes, denies smoking. Patient reports his father had an LED lesion. The patient denies any personal or familial history of PE, DVT, or clotting disorder. The patient reports  no recent travel, surgery, or other immobilizations.     Allergies:  No Known Drug Allergies    Medications:    Lisinopril   Zocor  Zyloprim    Past Medical History:    Gout  High cholesterol  Hypertension  ED  Obesity    Past Surgical History:    Vasectomy  Tonsillectomy and adenoidectomy     Family History:    No past pertinent family history.    Social History:  Former tobacco user: Chew  Positive for alcohol use.  Negative for drug use.  Marital Status:        Review of Systems   Constitutional: Negative for diaphoresis and fever.   Respiratory: Negative for cough and shortness of breath.    Cardiovascular: Positive for chest pain and leg swelling.   Gastrointestinal: Negative for abdominal pain and nausea.   Musculoskeletal:  Positive for back pain.   Neurological: Negative for dizziness.   All other systems reviewed and are negative.      Physical Exam     Patient Vitals for the past 24 hrs:   BP Temp Temp src Pulse Resp SpO2   10/17/19 2100 (!) 151/105 -- -- 80 -- 97 %   10/17/19 1839 (!) 146/105 98.8  F (37.1  C) Temporal 78 20 97 %     Physical Exam  General: Large adult male sitting upright  Eyes: PERRL, Conjunctive within normal limits.  ENT: Moist mucous membranes, oropharynx clear.   CV: Normal S1S2, no murmur, rub or gallop. Regular rate and rhythm. Radial pulses intact and equal at the bilateral wrists.  Resp: Clear to auscultation bilaterally, no wheezes, rales or rhonchi. Normal respiratory effort.  GI: Abdomen is soft, nontender and nondistended. No palpable masses. No rebound or guarding.  MSK: Bilateral symmetric lower extremity edema at the calves and ankles. Nontender. Normal active range of motion.  Skin: Warm and dry. No rashes or lesions or ecchymoses on visible skin.  Neuro: Alert and oriented. Responds appropriately to all questions and commands. No focal findings appreciated. Normal muscle tone.  Psych: Normal mood and affect. Pleasant.    Emergency Department Course   ECG:  Indication: Chest Pain  Time: 1844  Vent. Rate 73 bpm. WV interval 172. QRS duration 90. QT/QTc 426/469. P-R-T axis 49 17 29. Normal sinus rhythm. Read time: 2031    Imaging:  XR Chest, PA & LAT, G/E 3 views:   Negative chest. As per radiology.     Laboratory:  CBC: WBC: 6.6, HGB: 14.4, PLT: 153  BMP: Glucose 102 (H), Potassium: 3.2 (L), o/w WNL (Creatinine: 0.79)    D-dimer: <0.3    1926 Troponin: <0.015    Interventions:  2104 Klor-Con 20 mEq PO    Emergency Department Course:  Nursing notes and vitals reviewed. 2030 I performed an exam of the patient as documented above.     IV inserted. Medicine administered as documented above. Blood drawn. This was sent to the lab for further testing, results above.    The patient was sent for a chest  x-ray while in the emergency department, findings above.     EKG obtained in the ED, see results above.     2050 I rechecked the patient and discussed the results of his workup thus far. He denies any new concerns.     Findings and plan explained to the Patient. Patient discharged home with instructions regarding supportive care, medications, and reasons to return. The importance of close follow-up was reviewed.     I personally reviewed the laboratory results with the Patient and answered all related questions prior to discharge.     Impression & Plan    Medical Decision Making:  Amrik Burdick presented to the ED today for evaluation of chest pain. Details of the patient's history can be seen in the HPI. Differential diagnosis included ACS, dissection, pneumothorax, pneumonia, PE, costochondritis, pericarditis, panic attack, gastric reflux, amongst others. Workup in the ED yielded no acute abnormalities with CBC or BMP. Initial troponin returned negative. ECG did not show any evidence of STEMI. CXR was clear. The patient was low risk on Well's criteria and PERC negative. D-dimer was completed and came back negative. Due to the patient's reproducible chest discomfort, the patient's symptoms are more likely due to muscular etiology. I felt comfortable with the patient's discharge and outpatient follow-up. Potassium slightly low, possibly medication related. They will follow-up with their primary care provider within the next few days if their symptoms persist for an echocardiogram. The patient will return to the ED for worsening chest pain, shortness of breath, diaphoresis, weakness, loss of consciousness, or new concerns arise. All questions were answered prior to the patient's discharge. He was in agreement with the plan stated above.     Diagnosis:    ICD-10-CM    1. Chest pain, unspecified type R07.9 Echo Stress Echocardiogram   2. Hypertension, unspecified type I10        Disposition:  discharged to  home    Scribe Disclosure:  I, Patricia Quarles, am serving as a scribe on 10/17/2019 at 10:03 PM to personally document services performed by Carla Mcmahon MD, providers found based on my observations and the provider's statements to me.     Patricia Quarles  10/17/2019   [facility] EMERGENCY DEPARTMENT       Carla Mcmahon MD  10/19/19 0118

## 2019-10-23 ENCOUNTER — HOSPITAL ENCOUNTER (OUTPATIENT)
Dept: CARDIOLOGY | Facility: CLINIC | Age: 54
Discharge: HOME | End: 2019-10-23
Attending: EMERGENCY MEDICINE | Admitting: EMERGENCY MEDICINE
Payer: COMMERCIAL

## 2019-10-23 DIAGNOSIS — I10 HYPERTENSION, UNSPECIFIED TYPE: ICD-10-CM

## 2019-10-23 DIAGNOSIS — R07.9 CHEST PAIN, UNSPECIFIED TYPE: ICD-10-CM

## 2019-10-23 PROCEDURE — 93018 CV STRESS TEST I&R ONLY: CPT | Performed by: INTERNAL MEDICINE

## 2019-10-23 PROCEDURE — 40000264 ECHO STRESS ECHOCARDIOGRAM

## 2019-10-23 PROCEDURE — 93325 DOPPLER ECHO COLOR FLOW MAPG: CPT | Mod: 26 | Performed by: INTERNAL MEDICINE

## 2019-10-23 PROCEDURE — 25500064 ZZH RX 255 OP 636: Performed by: EMERGENCY MEDICINE

## 2019-10-23 PROCEDURE — 93350 STRESS TTE ONLY: CPT | Mod: 26 | Performed by: INTERNAL MEDICINE

## 2019-10-23 PROCEDURE — 93016 CV STRESS TEST SUPVJ ONLY: CPT | Performed by: INTERNAL MEDICINE

## 2019-10-23 PROCEDURE — 93321 DOPPLER ECHO F-UP/LMTD STD: CPT | Mod: 26 | Performed by: INTERNAL MEDICINE

## 2019-10-23 RX ADMIN — HUMAN ALBUMIN MICROSPHERES AND PERFLUTREN 4 ML: 10; .22 INJECTION, SOLUTION INTRAVENOUS at 08:42

## 2022-12-03 ENCOUNTER — HOSPITAL ENCOUNTER (EMERGENCY)
Facility: CLINIC | Age: 57
Discharge: HOME | End: 2022-12-03
Attending: EMERGENCY MEDICINE | Admitting: EMERGENCY MEDICINE
Payer: COMMERCIAL

## 2022-12-03 VITALS
BODY MASS INDEX: 36.6 KG/M2 | OXYGEN SATURATION: 98 % | RESPIRATION RATE: 18 BRPM | DIASTOLIC BLOOD PRESSURE: 93 MMHG | SYSTOLIC BLOOD PRESSURE: 151 MMHG | TEMPERATURE: 98.3 F | WEIGHT: 285.06 LBS | HEART RATE: 85 BPM

## 2022-12-03 DIAGNOSIS — R04.0 EPISTAXIS: ICD-10-CM

## 2022-12-03 PROCEDURE — 99283 EMERGENCY DEPT VISIT LOW MDM: CPT

## 2022-12-03 PROCEDURE — 250N000009 HC RX 250: Performed by: EMERGENCY MEDICINE

## 2022-12-03 RX ORDER — OXYMETAZOLINE HYDROCHLORIDE 0.05 G/100ML
2 SPRAY NASAL ONCE
Status: COMPLETED | OUTPATIENT
Start: 2022-12-03 | End: 2022-12-03

## 2022-12-03 RX ADMIN — Medication 2 SPRAY: at 10:05

## 2022-12-03 NOTE — ED TRIAGE NOTES
Nose bleed sudden onset was getting dressed for work and started to bleed 0230 am nose clip applied     Triage Assessment     Row Name 12/03/22 0506       Triage Assessment (Adult)    Airway WDL WDL       Respiratory WDL    Respiratory WDL WDL       Skin Circulation/Temperature WDL    Skin Circulation/Temperature WDL WDL       Cardiac WDL    Cardiac WDL WDL       Peripheral/Neurovascular WDL    Peripheral Neurovascular WDL WDL       Cognitive/Neuro/Behavioral WDL    Cognitive/Neuro/Behavioral WDL WDL

## 2022-12-03 NOTE — ED PROVIDER NOTES
History   Chief Complaint:  Epistaxis       HPI   Amrik Burdick is a 57 year old male with history of gout who presents with epistaxis.The patient states that this morning he woke up and showered as normal. Then while he was putting his socks on he felt like his nose was running but noticed that it was bleeding. He tried to stuff the nose and go to [employer] for a better solution but it would not stop bleeding so he came to the ED. He states he had a bad nosebleed about 25 years ago but he was able to get it to stop then without intervention in the ED. He denies using any blood thinners.    Review of Systems   HENT: Positive for nosebleeds.    All other systems reviewed and are negative.    Allergies:  The patient has no known allergies.     Medications:  Wellbutrin  Pravachol  Atarax  Zyloprim  Tenormin  Zestril  Paxil    Past Medical History:     Gout  High cholesterol  Hypothyroidism  Hypotestosteronism  Erectile dysfunction     Past Surgical History:    Tonsillectomy  Adenoidectomy  Vasectomy    Family History:    Father-CAD    Social History:  The patient presents to the ED alone.  PCP: Yasmine, [facility]     Physical Exam     Patient Vitals for the past 24 hrs:   BP Temp Temp src Pulse Resp SpO2 Weight   12/03/22 0912 (!) 153/107 -- -- 75 -- 98 % --   12/03/22 0504 (!) 166/99 98.3  F (36.8  C) Temporal 78 18 99 % 129.3 kg (285 lb 0.9 oz)       Physical Exam  Head:  The scalp, face, and head appear normal  Eyes:  Conjunctivae are normal  ENT:    Pinnae are normal.  Evidence of recent bleeding from right nare, however I am unable to visualize any obvious area of bleeding.  Neck:  Trachea midline  CV:  Normal rate  Resp:  No respiratory distress   Musc:  Normal muscular tone  Skin:  No rash or lesions noted  Neuro: Speech is normal and fluent. Face is symmetric. Moving all extremities well.   Psych:  Awake. Alert.  Normal affect.  Appropriate interactions.          Emergency Department Course      Emergency Department Course:       Reviewed:  I reviewed nursing notes, vitals, past medical history and Care Everywhere    Assessments:  0932 I obtained history and examined the patient as noted above.     Interventions:  1005 Afrin 2 sprays nasal    Disposition:  The patient was discharged to home.     Impression & Plan     Medical Decision Making:  Pt presents for evaluation of nasal bleeding and epistaxis.  The bleeding was controlled with pressure from a nasal clamp and therefore supportive outpatient management is indicated.  Close follow-up with ENT PRN as we didn't need to perform any intervention. Since bleeding was controlled with pressure only, I did not pursue work-up for coagulopathy.  Discussed using Afrin for the next 3 days, Vaseline twice a day, and avoiding any nose blowing/picking.  I was unable to visualize any area of bleeding, therefore I was unable to perform any cautery.  Recommended trying to sleep with humidified air.  We also discussed what to do in the event of recurrent bleeding in the form of evacuating clots, spraying Afrin, and applying nose clamp.  No indication for any further work-up/intervention.  He is discharged in stable condition.  Reasons to return discussed.  All questions answered.      Diagnosis:    ICD-10-CM    1. Epistaxis  R04.0         Scribe Disclosure:  Ilya PARKER, am serving as a scribe at 9:24 AM on 12/3/2022 to document services personally performed by Santana Estrada MD based on my observations and the provider's statements to me.            Santana Estrada MD  12/04/22 1196